# Patient Record
Sex: MALE | Race: WHITE | NOT HISPANIC OR LATINO | Employment: OTHER | URBAN - NONMETROPOLITAN AREA
[De-identification: names, ages, dates, MRNs, and addresses within clinical notes are randomized per-mention and may not be internally consistent; named-entity substitution may affect disease eponyms.]

---

## 2021-03-26 ENCOUNTER — APPOINTMENT (OUTPATIENT)
Dept: CT IMAGING | Facility: HOSPITAL | Age: 41
End: 2021-03-26

## 2021-03-26 ENCOUNTER — HOSPITAL ENCOUNTER (EMERGENCY)
Facility: HOSPITAL | Age: 41
Discharge: HOME | End: 2021-03-26
Attending: EMERGENCY MEDICINE | Admitting: EMERGENCY MEDICINE

## 2021-03-26 VITALS
RESPIRATION RATE: 18 BRPM | OXYGEN SATURATION: 99 % | DIASTOLIC BLOOD PRESSURE: 72 MMHG | WEIGHT: 220 LBS | SYSTOLIC BLOOD PRESSURE: 147 MMHG | TEMPERATURE: 97.2 F | HEART RATE: 76 BPM | HEIGHT: 76 IN | BODY MASS INDEX: 26.79 KG/M2

## 2021-03-26 DIAGNOSIS — R10.84 GENERALIZED ABDOMINAL PAIN: Primary | ICD-10-CM

## 2021-03-26 DIAGNOSIS — K92.1 BLOODY STOOL: ICD-10-CM

## 2021-03-26 LAB
ABO GROUP BLD: NORMAL
ABO GROUP BLD: NORMAL
ALBUMIN SERPL-MCNC: 4 G/DL (ref 3.5–5.2)
ALBUMIN/GLOB SERPL: 1.6 G/DL
ALP SERPL-CCNC: 59 U/L (ref 39–117)
ALT SERPL W P-5'-P-CCNC: 7 U/L (ref 1–41)
ANION GAP SERPL CALCULATED.3IONS-SCNC: 5 MMOL/L (ref 5–15)
AST SERPL-CCNC: 10 U/L (ref 1–40)
BASOPHILS # BLD AUTO: 0.04 10*3/MM3 (ref 0–0.2)
BASOPHILS NFR BLD AUTO: 0.7 % (ref 0–1.5)
BILIRUB SERPL-MCNC: 0.2 MG/DL (ref 0–1.2)
BLD GP AB SCN SERPL QL: NEGATIVE
BUN SERPL-MCNC: 13 MG/DL (ref 6–20)
BUN/CREAT SERPL: 13.7 (ref 7–25)
CALCIUM SPEC-SCNC: 8.8 MG/DL (ref 8.6–10.5)
CHLORIDE SERPL-SCNC: 104 MMOL/L (ref 98–107)
CO2 SERPL-SCNC: 28 MMOL/L (ref 22–29)
CREAT SERPL-MCNC: 0.95 MG/DL (ref 0.76–1.27)
DEPRECATED RDW RBC AUTO: 39 FL (ref 37–54)
EOSINOPHIL # BLD AUTO: 0.17 10*3/MM3 (ref 0–0.4)
EOSINOPHIL NFR BLD AUTO: 3 % (ref 0.3–6.2)
ERYTHROCYTE [DISTWIDTH] IN BLOOD BY AUTOMATED COUNT: 11.9 % (ref 12.3–15.4)
GFR SERPL CREATININE-BSD FRML MDRD: 88 ML/MIN/1.73
GLOBULIN UR ELPH-MCNC: 2.5 GM/DL
GLUCOSE SERPL-MCNC: 92 MG/DL (ref 65–99)
HCT VFR BLD AUTO: 33.4 % (ref 37.5–51)
HGB BLD-MCNC: 11.7 G/DL (ref 13–17.7)
HOLD SPECIMEN: NORMAL
HOLD SPECIMEN: NORMAL
IMM GRANULOCYTES # BLD AUTO: 0.02 10*3/MM3 (ref 0–0.05)
IMM GRANULOCYTES NFR BLD AUTO: 0.4 % (ref 0–0.5)
INR PPP: 1.07 (ref 0.8–1.2)
LYMPHOCYTES # BLD AUTO: 2.18 10*3/MM3 (ref 0.7–3.1)
LYMPHOCYTES NFR BLD AUTO: 39.1 % (ref 19.6–45.3)
Lab: NORMAL
MCH RBC QN AUTO: 31.6 PG (ref 26.6–33)
MCHC RBC AUTO-ENTMCNC: 35 G/DL (ref 31.5–35.7)
MCV RBC AUTO: 90.3 FL (ref 79–97)
MONOCYTES # BLD AUTO: 0.32 10*3/MM3 (ref 0.1–0.9)
MONOCYTES NFR BLD AUTO: 5.7 % (ref 5–12)
NEUTROPHILS NFR BLD AUTO: 2.85 10*3/MM3 (ref 1.7–7)
NEUTROPHILS NFR BLD AUTO: 51.1 % (ref 42.7–76)
NRBC BLD AUTO-RTO: 0 /100 WBC (ref 0–0.2)
PLATELET # BLD AUTO: 164 10*3/MM3 (ref 140–450)
PMV BLD AUTO: 10.7 FL (ref 6–12)
POTASSIUM SERPL-SCNC: 4.3 MMOL/L (ref 3.5–5.2)
PROT SERPL-MCNC: 6.5 G/DL (ref 6–8.5)
PROTHROMBIN TIME: 14.4 SECONDS (ref 11.1–15.3)
RBC # BLD AUTO: 3.7 10*6/MM3 (ref 4.14–5.8)
RH BLD: POSITIVE
RH BLD: POSITIVE
SODIUM SERPL-SCNC: 137 MMOL/L (ref 136–145)
T&S EXPIRATION DATE: NORMAL
WBC # BLD AUTO: 5.58 10*3/MM3 (ref 3.4–10.8)
WHOLE BLOOD HOLD SPECIMEN: NORMAL
WHOLE BLOOD HOLD SPECIMEN: NORMAL

## 2021-03-26 PROCEDURE — 96374 THER/PROPH/DIAG INJ IV PUSH: CPT

## 2021-03-26 PROCEDURE — 85025 COMPLETE CBC W/AUTO DIFF WBC: CPT | Performed by: EMERGENCY MEDICINE

## 2021-03-26 PROCEDURE — 86900 BLOOD TYPING SEROLOGIC ABO: CPT

## 2021-03-26 PROCEDURE — 96375 TX/PRO/DX INJ NEW DRUG ADDON: CPT

## 2021-03-26 PROCEDURE — 86901 BLOOD TYPING SEROLOGIC RH(D): CPT | Performed by: EMERGENCY MEDICINE

## 2021-03-26 PROCEDURE — 86901 BLOOD TYPING SEROLOGIC RH(D): CPT

## 2021-03-26 PROCEDURE — 25010000002 ONDANSETRON PER 1 MG: Performed by: EMERGENCY MEDICINE

## 2021-03-26 PROCEDURE — 25010000002 IOPAMIDOL 61 % SOLUTION: Performed by: EMERGENCY MEDICINE

## 2021-03-26 PROCEDURE — 86900 BLOOD TYPING SEROLOGIC ABO: CPT | Performed by: EMERGENCY MEDICINE

## 2021-03-26 PROCEDURE — 74177 CT ABD & PELVIS W/CONTRAST: CPT

## 2021-03-26 PROCEDURE — 36415 COLL VENOUS BLD VENIPUNCTURE: CPT | Performed by: FAMILY MEDICINE

## 2021-03-26 PROCEDURE — 80053 COMPREHEN METABOLIC PANEL: CPT

## 2021-03-26 PROCEDURE — 99284 EMERGENCY DEPT VISIT MOD MDM: CPT

## 2021-03-26 PROCEDURE — 85610 PROTHROMBIN TIME: CPT

## 2021-03-26 PROCEDURE — 25010000002 MORPHINE PER 10 MG: Performed by: EMERGENCY MEDICINE

## 2021-03-26 PROCEDURE — 86850 RBC ANTIBODY SCREEN: CPT | Performed by: EMERGENCY MEDICINE

## 2021-03-26 PROCEDURE — 0 DIATRIZOATE MEGLUMINE & SODIUM PER 1 ML: Performed by: EMERGENCY MEDICINE

## 2021-03-26 RX ORDER — SODIUM CHLORIDE 0.9 % (FLUSH) 0.9 %
10 SYRINGE (ML) INJECTION AS NEEDED
Status: DISCONTINUED | OUTPATIENT
Start: 2021-03-26 | End: 2021-03-26 | Stop reason: HOSPADM

## 2021-03-26 RX ORDER — ONDANSETRON 2 MG/ML
4 INJECTION INTRAMUSCULAR; INTRAVENOUS ONCE
Status: COMPLETED | OUTPATIENT
Start: 2021-03-26 | End: 2021-03-26

## 2021-03-26 RX ADMIN — DIATRIZOATE MEGLUMINE AND DIATRIZOATE SODIUM 30 ML: 660; 100 LIQUID ORAL; RECTAL at 15:51

## 2021-03-26 RX ADMIN — MORPHINE SULFATE 4 MG: 4 INJECTION INTRAVENOUS at 15:29

## 2021-03-26 RX ADMIN — IOPAMIDOL 90 ML: 612 INJECTION, SOLUTION INTRAVENOUS at 15:51

## 2021-03-26 RX ADMIN — ONDANSETRON HYDROCHLORIDE 4 MG: 2 INJECTION, SOLUTION INTRAMUSCULAR; INTRAVENOUS at 15:30

## 2021-03-26 NOTE — ED PROVIDER NOTES
Subjective   History of Present Illness    Review of Systems    Past Medical History:   Diagnosis Date   • Cancer (CMS/HCC)    • Migraine        No Known Allergies    Past Surgical History:   Procedure Laterality Date   • BRAIN SURGERY     • HIP SURGERY     • LEG SURGERY         History reviewed. No pertinent family history.    Social History     Socioeconomic History   • Marital status: Single     Spouse name: Not on file   • Number of children: Not on file   • Years of education: Not on file   • Highest education level: Not on file   Tobacco Use   • Smoking status: Current Every Day Smoker     Packs/day: 1.00     Years: 25.00     Pack years: 25.00     Types: Cigarettes   • Smokeless tobacco: Never Used   Substance and Sexual Activity   • Alcohol use: Never   • Drug use: Yes     Types: Marijuana     Comment: occasional   • Sexual activity: Defer           Objective   Physical Exam    Procedures           ED Course                                           MDM    Final diagnoses:   None       ED Disposition  ED Disposition     None          No follow-up provider specified.       Medication List      No changes were made to your prescriptions during this visit.

## 2021-03-26 NOTE — ED PROVIDER NOTES
"Subjective   41-year-old male presents to the emergency department with complaint of left lower quadrant abdominal pain and rectal bleeding.  He reports that the emergency department accompanied by family member with concern for left lower abdominal pain and GI bleeding. patient reports that he is actually been having some red rectal bleeding for \"quite some time\".  He reports that \"medical family members said that red blood is okay\".  However 2 to 3 days ago he started having large amounts of black tarry stool that \"sticks to the toilet and will not flush\".  This is with and without stool.  Reports pain worsened in the last couple of days but yesterday was his birthday so he waited until today to come in to be seen.  Denies any history of colonoscopy.  Denies history of intra-abdominal surgery.  Denies fevers or chills.  Some nausea without vomiting.    Family history, surgical history, social history, current medications and allergies are reviewed with the patient and triage documentation and vitals are reviewed.      History provided by:  Patient   used: No        Review of Systems   Constitutional: Negative for chills and fever.   HENT: Negative for congestion and sore throat.    Eyes: Negative for photophobia and visual disturbance.   Respiratory: Negative for cough, shortness of breath and wheezing.    Cardiovascular: Negative for chest pain, palpitations and leg swelling.   Gastrointestinal: Positive for abdominal pain, anal bleeding, blood in stool and nausea. Negative for constipation, diarrhea and vomiting.   Endocrine: Negative for polydipsia, polyphagia and polyuria.   Genitourinary: Negative for dysuria, frequency and urgency.   Musculoskeletal: Negative for arthralgias, back pain, myalgias and neck pain.   Skin: Negative for color change, rash and wound.   Allergic/Immunologic: Negative.    Neurological: Negative for speech difficulty, light-headedness and numbness.   Hematological: " Negative.    Psychiatric/Behavioral: Negative.        Past Medical History:   Diagnosis Date   • Cancer (CMS/HCC)    • Migraine        No Known Allergies    Past Surgical History:   Procedure Laterality Date   • BRAIN SURGERY     • HIP SURGERY     • LEG SURGERY         History reviewed. No pertinent family history.    Social History     Socioeconomic History   • Marital status: Single     Spouse name: Not on file   • Number of children: Not on file   • Years of education: Not on file   • Highest education level: Not on file   Tobacco Use   • Smoking status: Current Every Day Smoker     Packs/day: 1.00     Years: 25.00     Pack years: 25.00     Types: Cigarettes   • Smokeless tobacco: Never Used   Substance and Sexual Activity   • Alcohol use: Never   • Drug use: Yes     Types: Marijuana     Comment: occasional   • Sexual activity: Defer           Objective   Physical Exam  Vitals and nursing note reviewed.   Constitutional:       General: He is not in acute distress.     Appearance: He is well-developed. He is ill-appearing. He is not toxic-appearing or diaphoretic.   HENT:      Head: Normocephalic.      Mouth/Throat:      Mouth: Mucous membranes are moist.   Eyes:      General: No scleral icterus.     Pupils: Pupils are equal, round, and reactive to light.   Cardiovascular:      Rate and Rhythm: Normal rate and regular rhythm.      Heart sounds: Normal heart sounds. No murmur heard.     Pulmonary:      Effort: Pulmonary effort is normal. No respiratory distress.      Breath sounds: Normal breath sounds. No wheezing or rhonchi.   Abdominal:      General: Bowel sounds are decreased. There is no distension.      Palpations: Abdomen is soft. There is no hepatomegaly or splenomegaly.      Tenderness: There is abdominal tenderness in the periumbilical area and left lower quadrant. There is no guarding or rebound. Negative signs include Xiong's sign and McBurney's sign.      Hernia: No hernia is present.   Skin:      General: Skin is warm and dry.      Capillary Refill: Capillary refill takes less than 2 seconds.      Coloration: Skin is pale. Skin is not jaundiced.   Neurological:      General: No focal deficit present.      Mental Status: He is alert and oriented to person, place, and time.   Psychiatric:         Mood and Affect: Mood normal.         Behavior: Behavior normal.         Procedures  none         ED Course      Labs Reviewed   CBC WITH AUTO DIFFERENTIAL - Abnormal; Notable for the following components:       Result Value    RBC 3.70 (*)     Hemoglobin 11.7 (*)     Hematocrit 33.4 (*)     RDW 11.9 (*)     All other components within normal limits   PROTIME-INR - Normal    Narrative:     Therapeutic range for most indications is 2.0-3.0 INR,  or 2.5-3.5 for mechanical heart valves.   RAINBOW DRAW    Narrative:     The following orders were created for panel order [place] Draw.  Procedure                               Abnormality         Status                     ---------                               -----------         ------                     Light Blue Top[[id number]]                                   Final result               Green Top (Gel)[[id number]]                                  Final result               Lavender Top[[id number]]                                     Final result               Gold Top - SST[[id number]]                                   Final result                 Please view results for these tests on the individual orders.   COMPREHENSIVE METABOLIC PANEL    Narrative:     GFR Normal >60  Chronic Kidney Disease <60  Kidney Failure <15     TYPE AND SCREEN   PREVIOUS HISTORY   ABORH 2ND SPECIMEN VERIFICATION   CBC AND DIFFERENTIAL    Narrative:     The following orders were created for panel order CBC & Differential.  Procedure                               Abnormality         Status                     ---------                               -----------         ------                      CBC Auto Differential[[id number]]        Abnormal            Final result                 Please view results for these tests on the individual orders.   LIGHT BLUE TOP   GREEN TOP   LAVENDER TOP   GOLD TOP - SST     CT Abdomen Pelvis With Contrast    Result Date: 3/26/2021  Narrative: Patient Name: LELAND SINGH ORDERING: CARLA TOMAS ATTENDING: CARLA TOMAS REFERRING: CARLA TOMAS ----------------------- EXAM DESCRIPTION: CT ABDOMEN PELVIS W CONTRAST HISTORY: LLQ and back pain, GI bleeding COMPARISON: None Dose Length Product: 451.6. This exam was performed according to our departmental dose optimization program, which includes automated exposure control, adjustment of the mA and/or KV according to patient size and/or use of iterative reconstruction technique. CONTRAST: Oral and 90 mL intravenous Isovue 300.  TECHNIQUE: Multiple contiguous contrast enhanced axial images are obtained of the abdomen and pelvis with coronal and sagittal reformat images provided. FINDINGS: LOWER CHEST: No pulmonary consolidation or pleural effusion. No cardiac enlargement or pericardial effusion. HEPATOBILIARY: There is a tiny subcentimeter hypodensity within the left lobe of the liver near the dome too small to characterize however statistically represents a cyst. No suspicious enhancing lesion or ductal dilation. No calcified stones or pericholecystic inflammatory change. SPLEEN: Unremarkable. PANCREAS: Unremarkable ADRENAL GLANDS: Unremarkable. KIDNEYS/URETERS: No suspicious renal mass, renal/ureteral calcification, or obstructive uropathy. GASTROINTESTINAL: No small bowel obstruction. The terminal ileum and ileocecal junction are unremarkable. Normal appendix. No acute inflammatory changes of the large bowel. REPRODUCTIVE ORGANS: Unremarkable. URINARY BLADDER: Unremarkable VASCULAR: Unremarkable LYMPH NODES: No pathologically enlarged nodes by size criteria. PERITONEUM/RETROPERITONEUM: No free air or free fluid..  OSSEOUS STRUCTURES: Large perineural sacral cyst of Tarlov contributing to expansion scalloped appearance of the sacral canal. No acute findings. ADDITIONAL FINDINGS: Tiny fat-containing umbilical hernia.     Impression: No CT evidence of acute abdominal or pelvic finding. Large sacral cyst of Tarlov contributing to scalloped expanded appearance of the sacrum. Electronically signed by:  Calvin Chavez MD  3/26/2021 4:35 PM CDT Workstation: [id number]            MDM  Number of Diagnoses or Management Options     Amount and/or Complexity of Data Reviewed  Clinical lab tests: reviewed  Tests in the radiology section of CPT®: reviewed    Patient Progress  Patient progress: stable    Patient's symptoms have improved.  He has had a hemoglobin change but that is been since 2018.  No evidence of active bleeding.  Vital signs unremarkable.  CT reveals no evidence of cause of symptoms.  Patient is advised on close follow-up with gastroenterology and he is given referral there.  He is agreeable to discharge and plan.    Final diagnoses:   Generalized abdominal pain   Bloody stool       ED Disposition  ED Disposition     ED Disposition Condition Comment    Discharge Stable           Dilshad Bradshaw MD  [address] [place] [address] DR SILVERIO [address] [place] [id number] [phone]               Medication List      No changes were made to your prescriptions during this visit.          Adrián Summers,   03/26/21 1943

## 2021-03-26 NOTE — DISCHARGE INSTRUCTIONS
Please return with new or worsening symptoms.  Follow-up by contacting GI provided for appointment.

## 2021-03-29 ENCOUNTER — OFFICE VISIT (OUTPATIENT)
Dept: GASTROENTEROLOGY | Facility: CLINIC | Age: 41
End: 2021-03-29

## 2021-03-29 VITALS
BODY MASS INDEX: 28.64 KG/M2 | HEIGHT: 76 IN | SYSTOLIC BLOOD PRESSURE: 152 MMHG | WEIGHT: 235.2 LBS | HEART RATE: 64 BPM | DIASTOLIC BLOOD PRESSURE: 97 MMHG

## 2021-03-29 DIAGNOSIS — R11.0 NAUSEA: ICD-10-CM

## 2021-03-29 DIAGNOSIS — R10.84 GENERALIZED ABDOMINAL PAIN: Primary | ICD-10-CM

## 2021-03-29 DIAGNOSIS — R19.7 DIARRHEA, UNSPECIFIED TYPE: ICD-10-CM

## 2021-03-29 DIAGNOSIS — R63.4 WEIGHT LOSS: ICD-10-CM

## 2021-03-29 DIAGNOSIS — K92.1 MELENA: ICD-10-CM

## 2021-03-29 PROCEDURE — 99204 OFFICE O/P NEW MOD 45 MIN: CPT | Performed by: INTERNAL MEDICINE

## 2021-03-29 RX ORDER — OMEPRAZOLE 40 MG/1
40 CAPSULE, DELAYED RELEASE ORAL DAILY
Qty: 30 CAPSULE | Refills: 11 | Status: SHIPPED | OUTPATIENT
Start: 2021-03-29

## 2021-03-29 RX ORDER — DICYCLOMINE HYDROCHLORIDE 10 MG/1
20 CAPSULE ORAL
Qty: 240 CAPSULE | Refills: 5 | Status: SHIPPED | OUTPATIENT
Start: 2021-03-29 | End: 2021-04-28

## 2021-03-29 RX ORDER — DEXTROSE AND SODIUM CHLORIDE 5; .45 G/100ML; G/100ML
30 INJECTION, SOLUTION INTRAVENOUS CONTINUOUS PRN
Status: CANCELLED | OUTPATIENT
Start: 2021-04-02

## 2021-03-29 NOTE — PATIENT INSTRUCTIONS
"BMI for Adults  What is BMI?  Body mass index (BMI) is a number that is calculated from a person's weight and height. BMI can help estimate how much of a person's weight is composed of fat. BMI does not measure body fat directly. Rather, it is an alternative to procedures that directly measure body fat, which can be difficult and expensive.  BMI can help identify people who may be at higher risk for certain medical problems.  What are BMI measurements used for?  BMI is used as a screening tool to identify possible weight problems. It helps determine whether a person is obese, overweight, a healthy weight, or underweight.  BMI is useful for:  · Identifying a weight problem that may be related to a medical condition or may increase the risk for medical problems.  · Promoting changes, such as changes in diet and exercise, to help reach a healthy weight. BMI screening can be repeated to see if these changes are working.  How is BMI calculated?  BMI involves measuring your weight in relation to your height. Both height and weight are measured, and the BMI is calculated from those numbers. This can be done either in English (U[place] or metric measurements. Note that charts and online BMI calculators are available to help you find your BMI quickly and easily without having to do these calculations yourself.  To calculate your BMI in English (U.S[place] measurements:    1. Measure your weight in pounds (lb).  2. Multiply the number of pounds by 703.  ? For example, for a person who weighs 180 lb, multiply that number by 703, which equals 126,540.  3. Measure your height in inches. Then multiply that number by itself to get a measurement called \"inches squared.\"  ? For example, for a person who is 70 inches tall, the \"inches squared\" measurement is 70 inches x 70 inches, which equals 4,900 inches squared.  4. Divide the total from step 2 (number of lb x 703) by the total from step 3 (inches squared): 126,540 ÷ 4,900 = 25.8. This is " "your BMI.  To calculate your BMI in metric measurements:  1. Measure your weight in kilograms (kg).  2. Measure your height in meters (m). Then multiply that number by itself to get a measurement called \"meters squared.\"  ? For example, for a person who is 1.75 m tall, the \"meters squared\" measurement is 1.75 m x 1.75 m, which is equal to 3.1 meters squared.  3. Divide the number of kilograms (your weight) by the meters squared number. In this example: 70 ÷ 3.1 = 22.6. This is your BMI.  What do the results mean?  BMI charts are used to identify whether you are underweight, normal weight, overweight, or obese. The following guidelines will be used:  · Underweight: BMI less than 18.5.  · Normal weight: BMI between 18.5 and 24.9.  · Overweight: BMI between 25 and 29.9.  · Obese: BMI of 30 or above.  Keep these notes in mind:  · Weight includes both fat and muscle, so someone with a muscular build, such as an athlete, may have a BMI that is higher than 24.9. In cases like these, BMI is not an accurate measure of body fat.  · To determine if excess body fat is the cause of a BMI of 25 or higher, further assessments may need to be done by a health care provider.  · BMI is usually interpreted in the same way for men and women.  Where to find more information  For more information about BMI, including tools to quickly calculate your BMI, go to these websites:  · Centers for Disease Control and Prevention: www.cdc.gov  · American Heart Association: www.heart.org  · National Heart, Lung, and Blood [place]: www.nhlbi.nih.gov  Summary  · Body mass index (BMI) is a number that is calculated from a person's weight and height.  · BMI may help estimate how much of a person's weight is composed of fat. BMI can help identify those who may be at higher risk for certain medical problems.  · BMI can be measured using English measurements or metric measurements.  · BMI charts are used to identify whether you are underweight, normal " weight, overweight, or obese.  This information is not intended to replace advice given to you by your health care provider. Make sure you discuss any questions you have with your health care provider.  Document Revised: 09/09/2020 Document Reviewed: 07/17/2020  Elsevier Patient Education © 2021 [employer]

## 2021-03-29 NOTE — PROGRESS NOTES
[facility]      Chief Complaint:   Chief Complaint   Patient presents with   • er F/U   • Black or Bloody Stool   • Abdominal Pain       Subjective     HPI:   Mr. Sarkar is a 41-year-old male with past medical history of migraine, arthritis, peripheral neuropathy, tobacco abuse presenting for evaluation for abdominal pain.  He has intermittent bouts of generalized abdominal discomfort for past week associated with nausea, vomiting, diarrhea and melena.  Pain is worse with food intake and he also has back pain.  Denied rectal bleeding and last 8 to 10 pounds weight in past few weeks.  He takes Excedrin on as-needed basis for migraines.  He was seen at the emergency room 3 days ago and CT abdomen and pelvis was consistent with sacral cyst.  CBC was consistent with hemoglobin of 11.7.    Past Medical History:   Past Medical History:   Diagnosis Date   • Cancer (CMS/HCC)    • Migraine        Past Surgical History:  Past Surgical History:   Procedure Laterality Date   • BRAIN SURGERY     • HIP SURGERY     • LEG SURGERY         Family History:  History reviewed. No pertinent family history.    Social History:   reports that he has been smoking cigarettes. He has a 25.00 pack-year smoking history. He has never used smokeless tobacco. He reports current drug use. Drug: Marijuana. He reports that he does not drink alcohol.    Medications:   Prior to Admission medications    Medication Sig Start Date End Date Taking? Authorizing Provider   dicyclomine (Bentyl) 10 MG capsule Take 2 capsules by mouth 4 (Four) Times a Day Before Meals & at Bedtime for 30 days. 3/29/21 4/28/21  Mireille Lubin MD   omeprazole (priLOSEC) 40 MG capsule Take 1 capsule by mouth Daily. 3/29/21   Mireille Lubin MD       Allergies:  Patient has no known allergies.    ROS:    Review of Systems   Constitutional: Positive for unexpected weight change. Negative for chills, fatigue and fever.   HENT: Negative for congestion,  "ear discharge, hearing loss, nosebleeds and sore throat.    Eyes: Negative for pain, discharge and redness.   Respiratory: Negative for cough, chest tightness, shortness of breath and wheezing.    Cardiovascular: Negative for chest pain and palpitations.   Gastrointestinal: Positive for abdominal pain, blood in stool, diarrhea, nausea and vomiting. Negative for abdominal distention and constipation.   Endocrine: Negative for cold intolerance, polydipsia, polyphagia and polyuria.   Genitourinary: Negative for dysuria, flank pain, frequency, hematuria and urgency.   Musculoskeletal: Positive for back pain. Negative for arthralgias, joint swelling and myalgias.   Skin: Negative for color change, pallor and rash.   Neurological: Negative for tremors, seizures, syncope, weakness and headaches.   Hematological: Negative for adenopathy. Does not bruise/bleed easily.   Psychiatric/Behavioral: Negative for behavioral problems, confusion, dysphoric mood, hallucinations and suicidal ideas. The patient is not nervous/anxious.      Objective     Blood pressure 152/97, pulse 64, height 193 cm (76\"), weight 107 kg (235 lb 3.2 oz).    Physical Exam  Constitutional:       Appearance: He is well-developed.   HENT:      Head: Normocephalic and atraumatic.   Eyes:      Conjunctiva/sclera: Conjunctivae normal.      Pupils: Pupils are equal, round, and reactive to light.   Neck:      Thyroid: No thyromegaly.   Cardiovascular:      Rate and Rhythm: Normal rate and regular rhythm.      Heart sounds: Normal heart sounds. No murmur heard.     Pulmonary:      Effort: Pulmonary effort is normal.      Breath sounds: Normal breath sounds. No wheezing.   Abdominal:      General: Bowel sounds are normal. There is no distension.      Palpations: Abdomen is soft. There is no mass.      Tenderness: There is no abdominal tenderness.      Hernia: No hernia is present.   Genitourinary:     Comments: No lesions noted  Musculoskeletal:         General: No " tenderness. Normal range of motion.      Cervical back: Normal range of motion and neck supple.   Lymphadenopathy:      Cervical: No cervical adenopathy.   Skin:     General: Skin is warm and dry.      Findings: No rash.   Neurological:      Mental Status: He is alert and oriented to person, place, and time.      Cranial Nerves: No cranial nerve deficit.   Psychiatric:         Thought Content: Thought content normal.        Extremities: No edema, cyanosis or clubbing.    Assessment/Plan    1.  Abdominal pain with nausea and vomiting rule out peptic ulcer disease, gastritis and pancreaticobiliary pathology.  Could also be functional abdominal pain secondary to THC usage.  Add Prilosec 40 mg p.o. daily.  Proceed with EGD for further evaluation.  Discontinue NSAIDs.  2.  Abdominal pain with diarrhea and weight loss rule out inflammatory bowel disease and colorectal neoplasia.  Add Bentyl 20 mg p.o. 4 times daily.  Schedule colonoscopy for further evaluation.  3.  Melena and mild anemia rule out peptic ulcer disease, gastritis and lower GI hemorrhage.  Add PPI.  EGD and colonoscopy as scheduled.  Add iron supplements post procedure.  Avoid NSAIDs.  4.  Marijuana and tobacco smoking, recommend cessation.  5.  Obesity, recommend exercise and diet control.  Diagnoses and all orders for this visit:    1. Generalized abdominal pain (Primary)  -     Case Request; Standing  -     dextrose 5 % and sodium chloride 0.45 % infusion  -     Case Request    2. Nausea  -     Case Request; Standing  -     dextrose 5 % and sodium chloride 0.45 % infusion  -     Case Request    3. Diarrhea, unspecified type  -     Case Request; Standing  -     dextrose 5 % and sodium chloride 0.45 % infusion  -     Case Request    4. Melena  -     Case Request; Standing  -     dextrose 5 % and sodium chloride 0.45 % infusion  -     Case Request    5. Weight loss  -     Case Request; Standing  -     dextrose 5 % and sodium chloride 0.45 % infusion  -      Case Request    Other orders  -     Follow Anesthesia Guidelines / Standing Orders; Future  -     Obtain Informed Consent; Future  -     Implement Anesthesia Orders Day of Procedure; Standing  -     Obtain Informed Consent; Standing  -     POC Glucose Once; Standing  -     dicyclomine (Bentyl) 10 MG capsule; Take 2 capsules by mouth 4 (Four) Times a Day Before Meals & at Bedtime for 30 days.  Dispense: 240 capsule; Refill: 5  -     omeprazole (priLOSEC) 40 MG capsule; Take 1 capsule by mouth Daily.  Dispense: 30 capsule; Refill: 11        ESOPHAGOGASTRODUODENOSCOPY (N/A), COLONOSCOPY (N/A)     Diagnosis Plan   1. Generalized abdominal pain  Case Request    dextrose 5 % and sodium chloride 0.45 % infusion    Case Request   2. Nausea  Case Request    dextrose 5 % and sodium chloride 0.45 % infusion    Case Request   3. Diarrhea, unspecified type  Case Request    dextrose 5 % and sodium chloride 0.45 % infusion    Case Request   4. Melena  Case Request    dextrose 5 % and sodium chloride 0.45 % infusion    Case Request   5. Weight loss  Case Request    dextrose 5 % and sodium chloride 0.45 % infusion    Case Request       Anticipated Surgical Procedure:  Orders Placed This Encounter   Procedures   • Follow Anesthesia Guidelines / Standing Orders     Standing Status:   Future   • Obtain Informed Consent     Standing Status:   Future     Order Specific Question:   Informed Consent Given For     Answer:   egd and colonoscopy       The risks, benefits, and alternatives of this procedure have been discussed with the patient or the responsible party- the patient understands and agrees to proceed.            This document has been electronically signed by Mireille Lubin MD on March 29, 2021 15:52 CDT

## 2021-03-30 ENCOUNTER — LAB (OUTPATIENT)
Dept: LAB | Facility: HOSPITAL | Age: 41
End: 2021-03-30

## 2021-03-30 DIAGNOSIS — Z01.818 PREOP TESTING: Primary | ICD-10-CM

## 2021-03-30 LAB — SARS-COV-2 N GENE RESP QL NAA+PROBE: NOT DETECTED

## 2021-03-30 PROCEDURE — C9803 HOPD COVID-19 SPEC COLLECT: HCPCS

## 2021-03-30 PROCEDURE — 87635 SARS-COV-2 COVID-19 AMP PRB: CPT

## 2021-04-02 ENCOUNTER — ANESTHESIA EVENT (OUTPATIENT)
Dept: GASTROENTEROLOGY | Facility: HOSPITAL | Age: 41
End: 2021-04-02

## 2021-04-02 ENCOUNTER — ANESTHESIA (OUTPATIENT)
Dept: GASTROENTEROLOGY | Facility: HOSPITAL | Age: 41
End: 2021-04-02

## 2021-04-02 ENCOUNTER — HOSPITAL ENCOUNTER (OUTPATIENT)
Facility: HOSPITAL | Age: 41
Setting detail: HOSPITAL OUTPATIENT SURGERY
Discharge: HOME | End: 2021-04-02
Attending: INTERNAL MEDICINE | Admitting: INTERNAL MEDICINE

## 2021-04-02 VITALS
RESPIRATION RATE: 18 BRPM | TEMPERATURE: 97.3 F | HEART RATE: 85 BPM | DIASTOLIC BLOOD PRESSURE: 49 MMHG | HEIGHT: 76 IN | WEIGHT: 223.99 LBS | OXYGEN SATURATION: 98 % | BODY MASS INDEX: 27.28 KG/M2 | SYSTOLIC BLOOD PRESSURE: 96 MMHG

## 2021-04-02 DIAGNOSIS — R63.4 WEIGHT LOSS: ICD-10-CM

## 2021-04-02 DIAGNOSIS — R19.7 DIARRHEA, UNSPECIFIED TYPE: ICD-10-CM

## 2021-04-02 DIAGNOSIS — R10.84 GENERALIZED ABDOMINAL PAIN: ICD-10-CM

## 2021-04-02 DIAGNOSIS — K92.1 MELENA: ICD-10-CM

## 2021-04-02 DIAGNOSIS — R11.0 NAUSEA: ICD-10-CM

## 2021-04-02 PROCEDURE — 88305 TISSUE EXAM BY PATHOLOGIST: CPT

## 2021-04-02 PROCEDURE — 25010000002 PROPOFOL 10 MG/ML EMULSION: Performed by: NURSE ANESTHETIST, CERTIFIED REGISTERED

## 2021-04-02 PROCEDURE — 43239 EGD BIOPSY SINGLE/MULTIPLE: CPT | Performed by: INTERNAL MEDICINE

## 2021-04-02 PROCEDURE — 45380 COLONOSCOPY AND BIOPSY: CPT | Performed by: INTERNAL MEDICINE

## 2021-04-02 RX ORDER — LIDOCAINE HYDROCHLORIDE 20 MG/ML
INJECTION, SOLUTION INTRAVENOUS AS NEEDED
Status: DISCONTINUED | OUTPATIENT
Start: 2021-04-02 | End: 2021-04-02 | Stop reason: SURG

## 2021-04-02 RX ORDER — DEXTROSE AND SODIUM CHLORIDE 5; .45 G/100ML; G/100ML
30 INJECTION, SOLUTION INTRAVENOUS CONTINUOUS PRN
Status: DISCONTINUED | OUTPATIENT
Start: 2021-04-02 | End: 2021-04-02 | Stop reason: HOSPADM

## 2021-04-02 RX ORDER — PROPOFOL 10 MG/ML
VIAL (ML) INTRAVENOUS AS NEEDED
Status: DISCONTINUED | OUTPATIENT
Start: 2021-04-02 | End: 2021-04-02 | Stop reason: SURG

## 2021-04-02 RX ADMIN — PROPOFOL 100 MG: 10 INJECTION, EMULSION INTRAVENOUS at 15:57

## 2021-04-02 RX ADMIN — PROPOFOL 200 MG: 10 INJECTION, EMULSION INTRAVENOUS at 15:53

## 2021-04-02 RX ADMIN — PROPOFOL 100 MG: 10 INJECTION, EMULSION INTRAVENOUS at 15:55

## 2021-04-02 RX ADMIN — LIDOCAINE HYDROCHLORIDE 100 MG: 20 INJECTION, SOLUTION INTRAVENOUS at 15:51

## 2021-04-02 RX ADMIN — PROPOFOL 100 MG: 10 INJECTION, EMULSION INTRAVENOUS at 15:59

## 2021-04-02 RX ADMIN — DEXTROSE AND SODIUM CHLORIDE 30 ML/HR: 5; 450 INJECTION, SOLUTION INTRAVENOUS at 15:16

## 2021-04-02 RX ADMIN — PROPOFOL 200 MG: 10 INJECTION, EMULSION INTRAVENOUS at 15:51

## 2021-04-02 NOTE — ANESTHESIA POSTPROCEDURE EVALUATION
Patient: Brenden Sarkar    Procedure Summary     Date: 04/02/21 Room / Location: [facility] ENDOSCOPY 1 / [facility] ENDOSCOPY    Anesthesia Start: 1549 Anesthesia Stop: 1605    Procedures:       ESOPHAGOGASTRODUODENOSCOPY (N/A )      COLONOSCOPY (N/A ) Diagnosis:       Generalized abdominal pain      Nausea      Diarrhea, unspecified type      Melena      Weight loss      (Generalized abdominal pain [R10.84])      (Nausea [R11.0])      (Diarrhea, unspecified type [R19.7])      (Melena [K92.1])      (Weight loss [R63.4])    Surgeons: Mireille Lubin MD Provider: Chase Stoddard CRNA    Anesthesia Type: MAC ASA Status: 2          Anesthesia Type: MAC    Vitals  No vitals data found for the desired time range.          Post Anesthesia Care and Evaluation    Patient location during evaluation: bedside  Patient participation: waiting for patient participation  Level of consciousness: responsive to verbal stimuli  Pain management: adequate  Airway patency: patent  Anesthetic complications: No anesthetic complications  PONV Status: none  Cardiovascular status: acceptable  Respiratory status: acceptable  Hydration status: acceptable    Comments: ---------------------------               04/02/21                      1606         ---------------------------   BP:          95/54         Pulse:         87           Resp:          16           Temp:   97.3 °F (36.3 °C)   SpO2:          100%         ---------------------------

## 2021-04-02 NOTE — ANESTHESIA PREPROCEDURE EVALUATION
Anesthesia Evaluation     Patient summary reviewed   no history of anesthetic complications:  NPO Solid Status: > 8 hours  NPO Liquid Status: > 8 hours           Airway   Mallampati: II  TM distance: >3 FB  Neck ROM: full  No difficulty expected  Dental - normal exam     Pulmonary - normal exam   (+) a smoker,   Cardiovascular - normal exam  Exercise tolerance: good (4-7 METS)    (-) hypertension      Neuro/Psych  (+) headaches,     GI/Hepatic/Renal/Endo    (+) obesity,       Musculoskeletal (-) negative ROS    Abdominal  - normal exam   Substance History - negative use     OB/GYN          Other      history of cancer (childhood)                  Anesthesia Plan    ASA 2     MAC     intravenous induction     Anesthetic plan, all risks, benefits, and alternatives have been provided, discussed and informed consent has been obtained with: patient.

## 2021-04-07 LAB
LAB AP CASE REPORT: NORMAL
PATH REPORT.FINAL DX SPEC: NORMAL

## 2021-04-08 ENCOUNTER — OFFICE VISIT (OUTPATIENT)
Dept: GASTROENTEROLOGY | Facility: CLINIC | Age: 41
End: 2021-04-08

## 2021-04-08 VITALS
HEART RATE: 67 BPM | SYSTOLIC BLOOD PRESSURE: 127 MMHG | DIASTOLIC BLOOD PRESSURE: 74 MMHG | WEIGHT: 233.8 LBS | HEIGHT: 76 IN | BODY MASS INDEX: 28.47 KG/M2

## 2021-04-08 DIAGNOSIS — R11.0 NAUSEA: Primary | ICD-10-CM

## 2021-04-08 DIAGNOSIS — R10.84 GENERALIZED ABDOMINAL PAIN: ICD-10-CM

## 2021-04-08 PROCEDURE — 99213 OFFICE O/P EST LOW 20 MIN: CPT | Performed by: INTERNAL MEDICINE

## 2021-04-08 RX ORDER — SUMATRIPTAN 50 MG/1
50 TABLET, FILM COATED ORAL
COMMUNITY
Start: 2021-04-06

## 2021-04-08 RX ORDER — TOPIRAMATE 25 MG/1
25 TABLET ORAL
COMMUNITY
Start: 2021-04-06

## 2021-04-08 RX ORDER — FLUTICASONE PROPIONATE 50 MCG
1 SPRAY, SUSPENSION (ML) NASAL DAILY
COMMUNITY
Start: 2021-04-06

## 2021-04-08 RX ORDER — SUCRALFATE ORAL 1 G/10ML
1 SUSPENSION ORAL 4 TIMES DAILY
Qty: 1200 ML | Refills: 5 | Status: SHIPPED | OUTPATIENT
Start: 2021-04-08 | End: 2021-05-08

## 2021-04-08 NOTE — PROGRESS NOTES
Chief Complaint   Patient presents with   • f/u after endo       Subjective    Peter Laura Sarkar is a 41 y.o. male.    History of Present Illness  Patient presented to the GI clinic for follow-up visit today.  Has intermittent symptoms of abdominal pain and nausea.  Denied vomiting, diarrhea, constipation, rectal bleeding or weight loss.  EGD was consistent with hiatal hernia, esophagitis and gastritis.  Path was consistent with reactive gastropathy.  Colonoscopy was consistent with hemorrhoids.       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)   • Irregular heart rhythm    • Migraine      Past Surgical History:   Procedure Laterality Date   • COLONOSCOPY N/A 4/2/2021    Procedure: COLONOSCOPY;  Surgeon: Mireille Lubin MD;  Location: [facility] ENDOSCOPY;  Service: Gastroenterology;  Laterality: N/A;   • ENDOSCOPY N/A 4/2/2021    Procedure: ESOPHAGOGASTRODUODENOSCOPY;  Surgeon: Mireille Lubin MD;  Location: [facility] ENDOSCOPY;  Service: Gastroenterology;  Laterality: N/A;   • HIP SURGERY Bilateral    • INNER EAR SURGERY Right    • LEG SURGERY Right    • UPPER GASTROINTESTINAL ENDOSCOPY  04/02/2021     History reviewed. No pertinent family history.    Prior to Admission medications    Medication Sig Start Date End Date Taking? Authorizing Provider   dicyclomine (Bentyl) 10 MG capsule Take 2 capsules by mouth 4 (Four) Times a Day Before Meals & at Bedtime for 30 days. 3/29/21 4/28/21 Yes Mireille Lubin MD   fluticasone (FLONASE) 50 MCG/ACT nasal spray 1 spray into the nostril(s) as directed by provider Daily. 4/6/21  Yes Provider, MD Vasyl   omeprazole (priLOSEC) 40 MG capsule Take 1 capsule by mouth Daily. 3/29/21  Yes Mireille Lubin MD   SUMAtriptan (IMITREX) 50 MG tablet Take 50 mg by mouth. 4/6/21  Yes Abdirahman  MD Vasyl   topiramate (TOPAMAX) 25 MG tablet Take 25 mg by mouth. 4/6/21  Yes Provider, MD Vasyl   sucralfate (Carafate) 1 GM/10ML suspension Take 10 mL by mouth 4 (Four) Times a Day for 30 days. 4/8/21 5/8/21  Mireille Lubin MD     No Known Allergies  Social History     Socioeconomic History   • Marital status: Single     Spouse name: Not on file   • Number of children: Not on file   • Years of education: Not on file   • Highest education level: Not on file   Tobacco Use   • Smoking status: Current Every Day Smoker     Packs/day: 1.00     Years: 25.00     Pack years: 25.00     Types: Cigarettes   • Smokeless tobacco: Never Used   Vaping Use   • Vaping Use: Never used   Substance and Sexual Activity   • Alcohol use: Never   • Drug use: Yes     Types: Marijuana     Comment: occasional   • Sexual activity: Defer       Review of Systems  Review of Systems   Constitutional: Negative for chills, fatigue, fever and unexpected weight change.   HENT: Negative for congestion, ear discharge, hearing loss, nosebleeds and sore throat.    Eyes: Negative for pain, discharge and redness.   Respiratory: Negative for cough, chest tightness, shortness of breath and wheezing.    Cardiovascular: Negative for chest pain and palpitations.   Gastrointestinal: Positive for abdominal pain and nausea. Negative for abdominal distention, blood in stool, constipation, diarrhea and vomiting.   Endocrine: Negative for cold intolerance, polydipsia, polyphagia and polyuria.   Genitourinary: Negative for dysuria, flank pain, frequency, hematuria and urgency.   Musculoskeletal: Negative for arthralgias, back pain, joint swelling and myalgias.   Skin: Negative for color change, pallor and rash.   Neurological: Negative for tremors, seizures, syncope, weakness and headaches.   Hematological: Negative for adenopathy. Does not bruise/bleed easily.   Psychiatric/Behavioral: Negative for behavioral problems, confusion, dysphoric mood,  "hallucinations and suicidal ideas. The patient is not nervous/anxious.         /74 (BP Location: Right arm)   Pulse 67   Ht 193 cm (76\")   Wt 106 kg (233 lb 12.8 oz)   BMI 28.46 kg/m²     Objective    Physical Exam  Constitutional:       Appearance: He is well-developed.   HENT:      Head: Normocephalic and atraumatic.   Eyes:      Conjunctiva/sclera: Conjunctivae normal.      Pupils: Pupils are equal, round, and reactive to light.   Neck:      Thyroid: No thyromegaly.   Cardiovascular:      Rate and Rhythm: Normal rate and regular rhythm.      Heart sounds: Normal heart sounds. No murmur heard.     Pulmonary:      Effort: Pulmonary effort is normal.      Breath sounds: Normal breath sounds. No wheezing.   Abdominal:      General: Bowel sounds are normal. There is no distension.      Palpations: Abdomen is soft. There is no mass.      Tenderness: There is no abdominal tenderness.      Hernia: No hernia is present.   Genitourinary:     Comments: No lesions noted  Musculoskeletal:         General: No tenderness. Normal range of motion.      Cervical back: Normal range of motion and neck supple.   Lymphadenopathy:      Cervical: No cervical adenopathy.   Skin:     General: Skin is warm and dry.      Findings: No rash.   Neurological:      Mental Status: He is alert and oriented to person, place, and time.      Cranial Nerves: No cranial nerve deficit.   Psychiatric:         Thought Content: Thought content normal.       Admission on 04/02/2021, Discharged on 04/02/2021   Component Date Value Ref Range Status   • Case Report 04/02/2021    Final                    Value:Surgical Pathology Report                         Case: [id number]                                  Authorizing Provider:  Mireille Lubin MD      Collected:           04/02/2021 03:58 PM          Ordering Location:     [facility]             Received:            04/05/2021 07:08 AM                                 [place] END[facility]ITES "                                                     Pathologist:           Charly Rao MD                                                           Specimens:   1) - Small Intestine                                                                                2) - Gastric, Antrum                                                                                3) - Esophagus, Distal                                                                              4) - Small Intestine, TI                                                                            5) - Large Intestine                                                                      • Final Diagnosis 04/02/2021    Final                    Value:This result contains rich text formatting which cannot be displayed here.     Assessment/Plan    No diagnosis found..   1.  Abdominal pain with nausea and vomiting, likely due to reactive gastropathy.  Continue Prilosec 40 mg p.o. daily.    Add Carafate 1 g p.o. 4 times daily.  Right upper quadrant ultrasound if continues.  2.  Abdominal pain with diarrhea and weight loss, improved.  Continue Bentyl 20 mg p.o. 4 times daily.    3.  Melena and mild anemia, improved.  Continue PPI and iron supplements.  Avoid NSAIDs.  Repeat CBC next visit.  4.  Marijuana and tobacco smoking, recommend cessation.  5.  Obesity, recommend exercise and diet control.    Orders placed during this encounter include:  No orders of the defined types were placed in this encounter.      * Surgery not found *    Review and/or summary of lab tests, radiology, procedures, medications. Review and summary of old records and obtaining of history. The risks and benefits of my recommendations, as well as other treatment options were discussed with the patient today. Questions were answered.    New Medications Ordered This Visit   Medications   • sucralfate (Carafate) 1 GM/10ML suspension     Sig: Take 10 mL by mouth 4 (Four) Times a Day for 30  days.     Dispense:  1200 mL     Refill:  5       Follow-up: Return in about 1 month (around 5/8/2021).               Results for orders placed or performed during the hospital encounter of 04/02/21   Tissue Pathology Exam    Specimen: A: Small Intestine; Tissue    B: Gastric, Antrum; Tissue    C: Esophagus, Distal; Tissue    D: Small Intestine; Tissue    E: Large Intestine; Tissue   Result Value Ref Range    Case Report       Surgical Pathology Report                         Case: [id number]                                  Authorizing Provider:  Mireille Lubin MD      Collected:           04/02/2021 03:58 PM          Ordering Location:     [facility]             Received:            04/05/2021 07:08 AM                                 [place] ENDO SUITES                                                     Pathologist:           Charly Rao MD                                                           Specimens:   1) - Small Intestine                                                                                2) - Gastric, Antrum                                                                                3) - Esophagus, Distal                                                                              4) - Small Intestine, TI                                                                            5) - Large Intestine                                                                       Final Diagnosis       See Scanned Report       Results for orders placed or performed in visit on 03/30/21   COVID-19, [facility] IN-HOUSE, NP SWAB IN TRANSPORT MEDIA 8-10 HR TAT - Swab, Nasopharynx    Specimen: Nasopharynx; Swab   Result Value Ref Range    COVID19 Not Detected Not Detected - Ref. Range   Results for orders placed or performed during the hospital encounter of 03/26/21   PREVIOUS HISTORY    Specimen: Blood   Result Value Ref Range    Previous History No record on File    Gold Top - SST   Result  Value Ref Range    Extra Tube Hold for add-ons.    Green Top (Gel)   Result Value Ref Range    Extra Tube Hold for add-ons.    ABO RH Specimen Verification    Specimen: Blood   Result Value Ref Range    ABO Type A     RH type Positive    CBC Auto Differential    Specimen: Blood   Result Value Ref Range    WBC 5.58 3.40 - 10.80 10*3/mm3    RBC 3.70 (L) 4.14 - 5.80 10*6/mm3    Hemoglobin 11.7 (L) 13.0 - 17.7 g/dL    Hematocrit 33.4 (L) 37.5 - 51.0 %    MCV 90.3 79.0 - 97.0 fL    MCH 31.6 26.6 - 33.0 pg    MCHC 35.0 31.5 - 35.7 g/dL    RDW 11.9 (L) 12.3 - 15.4 %    RDW-SD 39.0 37.0 - 54.0 fl    MPV 10.7 6.0 - 12.0 fL    Platelets 164 140 - 450 10*3/mm3    Neutrophil % 51.1 42.7 - 76.0 %    Lymphocyte % 39.1 19.6 - 45.3 %    Monocyte % 5.7 5.0 - 12.0 %    Eosinophil % 3.0 0.3 - 6.2 %    Basophil % 0.7 0.0 - 1.5 %    Immature Grans % 0.4 0.0 - 0.5 %    Neutrophils, Absolute 2.85 1.70 - 7.00 10*3/mm3    Lymphocytes, Absolute 2.18 0.70 - 3.10 10*3/mm3    Monocytes, Absolute 0.32 0.10 - 0.90 10*3/mm3    Eosinophils, Absolute 0.17 0.00 - 0.40 10*3/mm3    Basophils, Absolute 0.04 0.00 - 0.20 10*3/mm3    Immature Grans, Absolute 0.02 0.00 - 0.05 10*3/mm3    nRBC 0.0 0.0 - 0.2 /100 WBC   Lavender Top   Result Value Ref Range    Extra Tube hold for add-on    Light Blue Top   Result Value Ref Range    Extra Tube hold for add-on    Protime-INR    Specimen: Blood   Result Value Ref Range    Protime 14.4 11.1 - 15.3 Seconds    INR 1.07 0.80 - 1.20   Type & Screen    Specimen: Blood   Result Value Ref Range    ABO Type A     RH type Positive     Antibody Screen Negative     T&S Expiration Date 3/29/2021 11:59:59 PM    Comprehensive Metabolic Panel    Specimen: Blood   Result Value Ref Range    Glucose 92 65 - 99 mg/dL    BUN 13 6 - 20 mg/dL    Creatinine 0.95 0.76 - 1.27 mg/dL    Sodium 137 136 - 145 mmol/L    Potassium 4.3 3.5 - 5.2 mmol/L    Chloride 104 98 - 107 mmol/L    CO2 28.0 22.0 - 29.0 mmol/L    Calcium 8.8 8.6 - 10.5 mg/dL  "   Total Protein 6.5 6.0 - 8.5 g/dL    Albumin 4.00 3.50 - 5.20 g/dL    ALT (SGPT) 7 1 - 41 U/L    AST (SGOT) 10 1 - 40 U/L    Alkaline Phosphatase 59 39 - 117 U/L    Total Bilirubin 0.2 0.0 - 1.2 mg/dL    eGFR Non African Amer 88 >60 mL/min/1.73    Globulin 2.5 gm/dL    A/G Ratio 1.6 g/dL    BUN/Creatinine Ratio 13.7 7.0 - 25.0    Anion Gap 5.0 5.0 - 15.0 mmol/L   Results for orders placed or performed in visit on 08/22/14   CBC and Differential    Specimen: Blood   Result Value Ref Range    WBC 5.3 3.2 - 9.8 x1000/uL    RBC 5.37 4.37 - 5.74 vi/mm3    Hemoglobin 17.2 13.7 - 17.3 gm/dl    Hematocrit 47.8 39.0 - 49.0 %    MCV 89.0 80.0 - 98.0 fl    MCH 32.0 26.0 - 34.0 pg    MCHC 36.0 31.5 - 36.3 gm/dl    RDW 12.4 11.5 - 14.5 %    Platelets 242 150 - 450 x1000/mm3    MPV 10.9 8.0 - 12.0 fl    Neutrophil Rel % 61.2 37.0 - 80.0 %    Lymphocyte Rel % 30.8 10.0 - 50.0 %    Monocyte Rel % 4.9 0.0 - 12.0 %    Eosinophil Rel % 2.1 0.0 - 7.0 %    Basophil Rel % 0.8 0.0 - 2.0 %    Immature Granulocyte Rel % 0.20 0.00 - 0.50 %    Neutrophils Absolute 3.27 2.00 - 8.60 x1000/uL    Lymphocytes Absolute 1.64 0.60 - 4.20 x1000/uL    Monocytes Absolute 0.26 0.00 - 0.90 x1000/uL    Eosinophils Absolute 0.11 0.00 - 0.70 x1000/uL    Basophils Absolute 0.04 0.00 - 0.20 x1000/uL    Immature Granulocytes Absolute 0.010 0.005 - 0.022 x1000/uL   Results for orders placed or performed in visit on 05/14/09   Converted Surgical Pathology    Specimen: Tissue   Result Value Ref Range    Spec Descr 1 SPECIMEN(S): A LIVER BIOPSY     Clinical Information   CLINICAL HISTORY:  None Given       Clinical Diagnosis   CLINICAL DIAGNOSIS:  Hepatitis B       Gross Description         GROSS DESCRIPTION:  The container is labeled \"biopsy of liver\" and has three cores of light  tan soft tissue measuring 0.3 cm, 0.5 cm and 1.0 cm long.  Each core  measures less than 0.1 cm in diameter.  The entire specimen is embedded.      Final Diagnosis         FINAL " DIAGNOSIS:  NEEDLE CORES, LIVER:       MILD PORTAL INFLAMMATION, GRADE 1 OF 4 (SCHEUER).       NEGATIVE FOR FIBROSIS, STAGE 0.       MILD HEMOSIDEROSIS OF HEPATOCYTES.      Comment         COMMENT:  Histochemical stains include trichrome, reticulin and iron.    DIAGNOSIS CODE:  6      CONVERTED (HISTORICAL) FINAL PATHOLOGIST       Diagnostician:  FRANKLYN BETANCOURT M.D.  Pathologist  Electronically Signed 05/15/2009           This document has been electronically signed by Mireille Lubin MD on April 8, 2021 17:34 CDT

## 2021-04-08 NOTE — PATIENT INSTRUCTIONS
"BMI for Adults  What is BMI?  Body mass index (BMI) is a number that is calculated from a person's weight and height. BMI can help estimate how much of a person's weight is composed of fat. BMI does not measure body fat directly. Rather, it is an alternative to procedures that directly measure body fat, which can be difficult and expensive.  BMI can help identify people who may be at higher risk for certain medical problems.  What are BMI measurements used for?  BMI is used as a screening tool to identify possible weight problems. It helps determine whether a person is obese, overweight, a healthy weight, or underweight.  BMI is useful for:  · Identifying a weight problem that may be related to a medical condition or may increase the risk for medical problems.  · Promoting changes, such as changes in diet and exercise, to help reach a healthy weight. BMI screening can be repeated to see if these changes are working.  How is BMI calculated?  BMI involves measuring your weight in relation to your height. Both height and weight are measured, and the BMI is calculated from those numbers. This can be done either in English ([place] or metric measurements. Note that charts and online BMI calculators are available to help you find your BMI quickly and easily without having to do these calculations yourself.  To calculate your BMI in English ([place] measurements:    1. Measure your weight in pounds (lb).  2. Multiply the number of pounds by 703.  ? For example, for a person who weighs 180 lb, multiply that number by 703, which equals 126,540.  3. Measure your height in inches. Then multiply that number by itself to get a measurement called \"inches squared.\"  ? For example, for a person who is 70 inches tall, the \"inches squared\" measurement is 70 inches x 70 inches, which equals 4,900 inches squared.  4. Divide the total from step 2 (number of lb x 703) by the total from step 3 (inches squared): 126,540 ÷ 4,900 = 25.8. This is " "your BMI.  To calculate your BMI in metric measurements:  1. Measure your weight in kilograms (kg).  2. Measure your height in meters (m). Then multiply that number by itself to get a measurement called \"meters squared.\"  ? For example, for a person who is 1.75 m tall, the \"meters squared\" measurement is 1.75 m x 1.75 m, which is equal to 3.1 meters squared.  3. Divide the number of kilograms (your weight) by the meters squared number. In this example: 70 ÷ 3.1 = 22.6. This is your BMI.  What do the results mean?  BMI charts are used to identify whether you are underweight, normal weight, overweight, or obese. The following guidelines will be used:  · Underweight: BMI less than 18.5.  · Normal weight: BMI between 18.5 and 24.9.  · Overweight: BMI between 25 and 29.9.  · Obese: BMI of 30 or above.  Keep these notes in mind:  · Weight includes both fat and muscle, so someone with a muscular build, such as an athlete, may have a BMI that is higher than 24.9. In cases like these, BMI is not an accurate measure of body fat.  · To determine if excess body fat is the cause of a BMI of 25 or higher, further assessments may need to be done by a health care provider.  · BMI is usually interpreted in the same way for men and women.  Where to find more information  For more information about BMI, including tools to quickly calculate your BMI, go to these websites:  · Centers for Disease Control and Prevention: www.cdc.gov  · American Heart Association: www.heart.org  · National Heart, Lung, [facility] [place]: www.nhlbi.nih.gov  Summary  · Body mass index (BMI) is a number that is calculated from a person's weight and height.  · BMI may help estimate how much of a person's weight is composed of fat. BMI can help identify those who may be at higher risk for certain medical problems.  · BMI can be measured using English measurements or metric measurements.  · BMI charts are used to identify whether you are underweight, normal " weight, overweight, or obese.  This information is not intended to replace advice given to you by your health care provider. Make sure you discuss any questions you have with your health care provider.  Document Revised: 09/09/2020 Document Reviewed: 07/17/2020  Elsevier Patient Education © 2021 [employer]

## 2021-05-10 ENCOUNTER — OFFICE VISIT (OUTPATIENT)
Dept: GASTROENTEROLOGY | Facility: CLINIC | Age: 41
End: 2021-05-10

## 2021-05-10 VITALS
DIASTOLIC BLOOD PRESSURE: 91 MMHG | WEIGHT: 227.8 LBS | BODY MASS INDEX: 27.74 KG/M2 | SYSTOLIC BLOOD PRESSURE: 134 MMHG | HEART RATE: 73 BPM | HEIGHT: 76 IN

## 2021-05-10 DIAGNOSIS — R10.84 GENERALIZED ABDOMINAL PAIN: Primary | ICD-10-CM

## 2021-05-10 PROCEDURE — 99213 OFFICE O/P EST LOW 20 MIN: CPT | Performed by: INTERNAL MEDICINE

## 2021-05-10 NOTE — PATIENT INSTRUCTIONS
"BMI for Adults  What is BMI?  Body mass index (BMI) is a number that is calculated from a person's weight and height. BMI can help estimate how much of a person's weight is composed of fat. BMI does not measure body fat directly. Rather, it is an alternative to procedures that directly measure body fat, which can be difficult and expensive.  BMI can help identify people who may be at higher risk for certain medical problems.  What are BMI measurements used for?  BMI is used as a screening tool to identify possible weight problems. It helps determine whether a person is obese, overweight, a healthy weight, or underweight.  BMI is useful for:  · Identifying a weight problem that may be related to a medical condition or may increase the risk for medical problems.  · Promoting changes, such as changes in diet and exercise, to help reach a healthy weight. BMI screening can be repeated to see if these changes are working.  How is BMI calculated?  BMI involves measuring your weight in relation to your height. Both height and weight are measured, and the BMI is calculated from those numbers. This can be done either in English (U[place] or metric measurements. Note that charts and online BMI calculators are available to help you find your BMI quickly and easily without having to do these calculations yourself.  To calculate your BMI in English (U.S[place] measurements:    1. Measure your weight in pounds (lb).  2. Multiply the number of pounds by 703.  ? For example, for a person who weighs 180 lb, multiply that number by 703, which equals 126,540.  3. Measure your height in inches. Then multiply that number by itself to get a measurement called \"inches squared.\"  ? For example, for a person who is 70 inches tall, the \"inches squared\" measurement is 70 inches x 70 inches, which equals 4,900 inches squared.  4. Divide the total from step 2 (number of lb x 703) by the total from step 3 (inches squared): 126,540 ÷ 4,900 = 25.8. This is " "your BMI.  To calculate your BMI in metric measurements:  1. Measure your weight in kilograms (kg).  2. Measure your height in meters (m). Then multiply that number by itself to get a measurement called \"meters squared.\"  ? For example, for a person who is 1.75 m tall, the \"meters squared\" measurement is 1.75 m x 1.75 m, which is equal to 3.1 meters squared.  3. Divide the number of kilograms (your weight) by the meters squared number. In this example: 70 ÷ 3.1 = 22.6. This is your BMI.  What do the results mean?  BMI charts are used to identify whether you are underweight, normal weight, overweight, or obese. The following guidelines will be used:  · Underweight: BMI less than 18.5.  · Normal weight: BMI between 18.5 and 24.9.  · Overweight: BMI between 25 and 29.9.  · Obese: BMI of 30 or above.  Keep these notes in mind:  · Weight includes both fat and muscle, so someone with a muscular build, such as an athlete, may have a BMI that is higher than 24.9. In cases like these, BMI is not an accurate measure of body fat.  · To determine if excess body fat is the cause of a BMI of 25 or higher, further assessments may need to be done by a health care provider.  · BMI is usually interpreted in the same way for men and women.  Where to find more information  For more information about BMI, including tools to quickly calculate your BMI, go to these websites:  · Centers for Disease Control and Prevention: www.cdc.gov  · American Heart Association: www.heart.org  · National Heart, Lung, and Blood [place]: www.nhlbi.nih.gov  Summary  · Body mass index (BMI) is a number that is calculated from a person's weight and height.  · BMI may help estimate how much of a person's weight is composed of fat. BMI can help identify those who may be at higher risk for certain medical problems.  · BMI can be measured using English measurements or metric measurements.  · BMI charts are used to identify whether you are underweight, normal " weight, overweight, or obese.  This information is not intended to replace advice given to you by your health care provider. Make sure you discuss any questions you have with your health care provider.  Document Revised: 09/09/2020 Document Reviewed: 07/17/2020  Elsevier Patient Education © 2021 [employer]

## 2021-05-11 NOTE — PROGRESS NOTES
Chief Complaint   Patient presents with   • 1 month f/u       Subjective    Brenden Sarkar is a 41 y.o. male.    History of Present Illness  Patient presented to GI clinic for follow-up visit today.  Stop smoking cigarettes and marijuana.  Complaining of aching all over.  No further abdominal pain, nausea or vomiting.  Diarrhea has improved.  No further rectal bleeding.  No GI complaints at this time.       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)   • Irregular heart rhythm    • Migraine      Past Surgical History:   Procedure Laterality Date   • COLONOSCOPY N/A 4/2/2021    Procedure: COLONOSCOPY;  Surgeon: Mireille Lubin MD;  Location: [facility] ENDOSCOPY;  Service: Gastroenterology;  Laterality: N/A;   • ENDOSCOPY N/A 4/2/2021    Procedure: ESOPHAGOGASTRODUODENOSCOPY;  Surgeon: Mireille Lubin MD;  Location: [facility] ENDOSCOPY;  Service: Gastroenterology;  Laterality: N/A;   • HIP SURGERY Bilateral    • INNER EAR SURGERY Right    • LEG SURGERY Right    • UPPER GASTROINTESTINAL ENDOSCOPY  04/02/2021     History reviewed. No pertinent family history.    Prior to Admission medications    Medication Sig Start Date End Date Taking? Authorizing Provider   fluticasone (FLONASE) 50 MCG/ACT nasal spray 1 spray into the nostril(s) as directed by provider Daily. 4/6/21  Yes Vasyl Gary MD   omeprazole (priLOSEC) 40 MG capsule Take 1 capsule by mouth Daily. 3/29/21  Yes Mireille Lubin MD   SUMAtriptan (IMITREX) 50 MG tablet Take 50 mg by mouth. 4/6/21  Yes Vasyl Gary MD   topiramate (TOPAMAX) 25 MG tablet Take 25 mg by mouth. 4/6/21  Yes Vasyl Gary MD     No Known Allergies  Social History     Socioeconomic History   • Marital status: Single     Spouse name: Not on file   • Number of children:  "Not on file   • Years of education: Not on file   • Highest education level: Not on file   Tobacco Use   • Smoking status: Current Every Day Smoker     Packs/day: 1.00     Years: 25.00     Pack years: 25.00     Types: Cigarettes   • Smokeless tobacco: Never Used   • Tobacco comment: stopped smoking 05/07/2021   Vaping Use   • Vaping Use: Never used   Substance and Sexual Activity   • Alcohol use: Never   • Drug use: Yes     Types: Marijuana     Comment: occasional   • Sexual activity: Defer       Review of Systems  Review of Systems   Constitutional: Negative for chills, fatigue, fever and unexpected weight change.   HENT: Negative for congestion, ear discharge, hearing loss, nosebleeds and sore throat.    Eyes: Negative for pain, discharge and redness.   Respiratory: Negative for cough, chest tightness, shortness of breath and wheezing.    Cardiovascular: Negative for chest pain and palpitations.   Gastrointestinal: Negative for abdominal distention, abdominal pain, blood in stool, constipation, diarrhea, nausea and vomiting.   Endocrine: Negative for cold intolerance, polydipsia, polyphagia and polyuria.   Genitourinary: Negative for dysuria, flank pain, frequency, hematuria and urgency.   Musculoskeletal: Positive for arthralgias and myalgias. Negative for back pain and joint swelling.   Skin: Negative for color change, pallor and rash.   Neurological: Negative for tremors, seizures, syncope, weakness and headaches.   Hematological: Negative for adenopathy. Does not bruise/bleed easily.   Psychiatric/Behavioral: Negative for behavioral problems, confusion, dysphoric mood, hallucinations and suicidal ideas. The patient is not nervous/anxious.         /91 (BP Location: Right arm)   Pulse 73   Ht 193 cm (76\")   Wt 103 kg (227 lb 12.8 oz)   BMI 27.73 kg/m²     Objective    Physical Exam  Constitutional:       Appearance: He is well-developed.   HENT:      Head: Normocephalic and atraumatic.   Eyes:      " Conjunctiva/sclera: Conjunctivae normal.      Pupils: Pupils are equal, round, and reactive to light.   Neck:      Thyroid: No thyromegaly.   Cardiovascular:      Rate and Rhythm: Normal rate and regular rhythm.      Heart sounds: Normal heart sounds. No murmur heard.     Pulmonary:      Effort: Pulmonary effort is normal.      Breath sounds: Normal breath sounds. No wheezing.   Abdominal:      General: Bowel sounds are normal. There is no distension.      Palpations: Abdomen is soft. There is no mass.      Tenderness: There is no abdominal tenderness.      Hernia: No hernia is present.   Genitourinary:     Comments: No lesions noted  Musculoskeletal:         General: No tenderness. Normal range of motion.      Cervical back: Normal range of motion and neck supple.   Lymphadenopathy:      Cervical: No cervical adenopathy.   Skin:     General: Skin is warm and dry.      Findings: No rash.   Neurological:      Mental Status: He is alert and oriented to person, place, and time.      Cranial Nerves: No cranial nerve deficit.   Psychiatric:         Thought Content: Thought content normal.       Admission on 04/02/2021, Discharged on 04/02/2021   Component Date Value Ref Range Status   • Case Report 04/02/2021    Final                    Value:Surgical Pathology Report                         Case: [id number]                                  Authorizing Provider:  Mireille Lubin MD      Collected:           04/02/2021 03:58 PM          Ordering Location:     [facility]             Received:            04/05/2021 07:08 AM                                 [place] [facility]                                                     Pathologist:           Charly Rao MD                                                           Specimens:   1) - Small Intestine                                                                                2) - Gastric, Antrum                                                                                 3) - Esophagus, Distal                                                                              4) - Small Intestine, TI                                                                            5) - Large Intestine                                                                      • Final Diagnosis 04/02/2021    Final                    Value:This result contains rich text formatting which cannot be displayed here.     Assessment/Plan    No diagnosis found..   1.  Abdominal pain with nausea and vomiting, resolved.  Likely due to marijuana usage and reactive gastropathy.  Continue Prilosec 40 mg p.o. daily and Carafate 1 g p.o. 4 times daily.  Right upper quadrant ultrasound if recurs.  2.  Abdominal pain with diarrhea and weight loss, improved.  Continue Bentyl 20 mg p.o. 4 times daily.    3.  Melena and mild anemia, improved.  Continue PPI and iron supplements.  Avoid NSAIDs.  Repeat CBC next visit.  4.  Marijuana and tobacco smoking, off currently..  5.  High BMI, recommend exercise and diet control.  6.  Myalgias and arthralgias, recommend PCP evaluation.    Orders placed during this encounter include:  No orders of the defined types were placed in this encounter.      * Surgery not found *    Review and/or summary of lab tests, radiology, procedures, medications. Review and summary of old records and obtaining of history. The risks and benefits of my recommendations, as well as other treatment options were discussed with the patient today. Questions were answered.    No orders of the defined types were placed in this encounter.      Follow-up: Return in about 3 months (around 8/10/2021).               Results for orders placed or performed during the hospital encounter of 04/02/21   Tissue Pathology Exam    Specimen: A: Small Intestine; Tissue    B: Gastric, Antrum; Tissue    C: Esophagus, Distal; Tissue    D: Small Intestine; Tissue    E: Large Intestine; Tissue   Result Value Ref  Range    Case Report       Surgical Pathology Report                         Case: [id number]                                  Authorizing Provider:  Mireille Lubin MD      Collected:           04/02/2021 03:58 PM          Ordering Location:     [facility]             Received:            04/05/2021 07:08 AM                                 [place] [facility]                                                     Pathologist:           Charly Rao MD                                                           Specimens:   1) - Small Intestine                                                                                2) - Gastric, Antrum                                                                                3) - Esophagus, Distal                                                                              4) - Small Intestine, TI                                                                            5) - Large Intestine                                                                       Final Diagnosis       See Scanned Report       Results for orders placed or performed in visit on 03/30/21   COVID-19, [facility] IN-HOUSE, NP SWAB IN TRANSPORT MEDIA 8-10 HR TAT - Swab, Nasopharynx    Specimen: Nasopharynx; Swab   Result Value Ref Range    COVID19 Not Detected Not Detected - Ref. Range   Results for orders placed or performed during the hospital encounter of 03/26/21   PREVIOUS HISTORY    Specimen: Blood   Result Value Ref Range    Previous History No record on File    Gold Top - SST   Result Value Ref Range    Extra Tube Hold for add-ons.    Green Top (Gel)   Result Value Ref Range    Extra Tube Hold for add-ons.    ABO RH Specimen Verification    Specimen: Blood   Result Value Ref Range    ABO Type A     RH type Positive    CBC Auto Differential    Specimen: Blood   Result Value Ref Range    WBC 5.58 3.40 - 10.80 10*3/mm3    RBC 3.70 (L) 4.14 - 5.80 10*6/mm3    Hemoglobin 11.7 (L) 13.0 -  17.7 g/dL    Hematocrit 33.4 (L) 37.5 - 51.0 %    MCV 90.3 79.0 - 97.0 fL    MCH 31.6 26.6 - 33.0 pg    MCHC 35.0 31.5 - 35.7 g/dL    RDW 11.9 (L) 12.3 - 15.4 %    RDW-SD 39.0 37.0 - 54.0 fl    MPV 10.7 6.0 - 12.0 fL    Platelets 164 140 - 450 10*3/mm3    Neutrophil % 51.1 42.7 - 76.0 %    Lymphocyte % 39.1 19.6 - 45.3 %    Monocyte % 5.7 5.0 - 12.0 %    Eosinophil % 3.0 0.3 - 6.2 %    Basophil % 0.7 0.0 - 1.5 %    Immature Grans % 0.4 0.0 - 0.5 %    Neutrophils, Absolute 2.85 1.70 - 7.00 10*3/mm3    Lymphocytes, Absolute 2.18 0.70 - 3.10 10*3/mm3    Monocytes, Absolute 0.32 0.10 - 0.90 10*3/mm3    Eosinophils, Absolute 0.17 0.00 - 0.40 10*3/mm3    Basophils, Absolute 0.04 0.00 - 0.20 10*3/mm3    Immature Grans, Absolute 0.02 0.00 - 0.05 10*3/mm3    nRBC 0.0 0.0 - 0.2 /100 WBC   Lavender Top   Result Value Ref Range    Extra Tube hold for add-on    Light Blue Top   Result Value Ref Range    Extra Tube hold for add-on    Protime-INR    Specimen: Blood   Result Value Ref Range    Protime 14.4 11.1 - 15.3 Seconds    INR 1.07 0.80 - 1.20   Type & Screen    Specimen: Blood   Result Value Ref Range    ABO Type A     RH type Positive     Antibody Screen Negative     T&S Expiration Date 3/29/2021 11:59:59 PM    Comprehensive Metabolic Panel    Specimen: Blood   Result Value Ref Range    Glucose 92 65 - 99 mg/dL    BUN 13 6 - 20 mg/dL    Creatinine 0.95 0.76 - 1.27 mg/dL    Sodium 137 136 - 145 mmol/L    Potassium 4.3 3.5 - 5.2 mmol/L    Chloride 104 98 - 107 mmol/L    CO2 28.0 22.0 - 29.0 mmol/L    Calcium 8.8 8.6 - 10.5 mg/dL    Total Protein 6.5 6.0 - 8.5 g/dL    Albumin 4.00 3.50 - 5.20 g/dL    ALT (SGPT) 7 1 - 41 U/L    AST (SGOT) 10 1 - 40 U/L    Alkaline Phosphatase 59 39 - 117 U/L    Total Bilirubin 0.2 0.0 - 1.2 mg/dL    eGFR Non African Amer 88 >60 mL/min/1.73    Globulin 2.5 gm/dL    A/G Ratio 1.6 g/dL    BUN/Creatinine Ratio 13.7 7.0 - 25.0    Anion Gap 5.0 5.0 - 15.0 mmol/L   Results for orders placed or  "performed in visit on 08/22/14   CBC and Differential    Specimen: Blood   Result Value Ref Range    WBC 5.3 3.2 - 9.8 x1000/uL    RBC 5.37 4.37 - 5.74 vi/mm3    Hemoglobin 17.2 13.7 - 17.3 gm/dl    Hematocrit 47.8 39.0 - 49.0 %    MCV 89.0 80.0 - 98.0 fl    MCH 32.0 26.0 - 34.0 pg    MCHC 36.0 31.5 - 36.3 gm/dl    RDW 12.4 11.5 - 14.5 %    Platelets 242 150 - 450 x1000/mm3    MPV 10.9 8.0 - 12.0 fl    Neutrophil Rel % 61.2 37.0 - 80.0 %    Lymphocyte Rel % 30.8 10.0 - 50.0 %    Monocyte Rel % 4.9 0.0 - 12.0 %    Eosinophil Rel % 2.1 0.0 - 7.0 %    Basophil Rel % 0.8 0.0 - 2.0 %    Immature Granulocyte Rel % 0.20 0.00 - 0.50 %    Neutrophils Absolute 3.27 2.00 - 8.60 x1000/uL    Lymphocytes Absolute 1.64 0.60 - 4.20 x1000/uL    Monocytes Absolute 0.26 0.00 - 0.90 x1000/uL    Eosinophils Absolute 0.11 0.00 - 0.70 x1000/uL    Basophils Absolute 0.04 0.00 - 0.20 x1000/uL    Immature Granulocytes Absolute 0.010 0.005 - 0.022 x1000/uL   Results for orders placed or performed in visit on 05/14/09   Converted Surgical Pathology    Specimen: Tissue   Result Value Ref Range    Spec Descr 1 SPECIMEN(S): A LIVER BIOPSY     Clinical Information   CLINICAL HISTORY:  None Given       Clinical Diagnosis   CLINICAL DIAGNOSIS:  Hepatitis B       Gross Description         GROSS DESCRIPTION:  The container is labeled \"biopsy of liver\" and has three cores of light  tan soft tissue measuring 0.3 cm, 0.5 cm and 1.0 cm long.  Each core  measures less than 0.1 cm in diameter.  The entire specimen is embedded.      Final Diagnosis         FINAL DIAGNOSIS:  NEEDLE CORES, LIVER:       MILD PORTAL INFLAMMATION, GRADE 1 OF 4 (SCHEUER).       NEGATIVE FOR FIBROSIS, STAGE 0.       MILD HEMOSIDEROSIS OF HEPATOCYTES.      Comment         COMMENT:  Histochemical stains include trichrome, reticulin and iron.    DIAGNOSIS CODE:  6      CONVERTED (HISTORICAL) FINAL PATHOLOGIST       Diagnostician:  FRANKLYN BETANCOURT M.D.  Pathologist  Electronically " Signed 05/15/2009           This document has been electronically signed by Mireille Lubin MD on May 10, 2021 21:05 CDT

## 2021-08-10 ENCOUNTER — TELEPHONE (OUTPATIENT)
Dept: GASTROENTEROLOGY | Facility: CLINIC | Age: 41
End: 2021-08-10

## (undated) DEVICE — SINGLE-USE BIOPSY FORCEPS: Brand: RADIAL JAW 4

## (undated) DEVICE — BITEBLOCK ENDO W/STRAP 60F A/ LF DISP